# Patient Record
Sex: MALE | URBAN - METROPOLITAN AREA
[De-identification: names, ages, dates, MRNs, and addresses within clinical notes are randomized per-mention and may not be internally consistent; named-entity substitution may affect disease eponyms.]

---

## 2023-08-25 ENCOUNTER — TELEPHONE (OUTPATIENT)
Dept: CARDIOLOGY CLINIC | Age: 68
End: 2023-08-25

## 2023-08-25 NOTE — TELEPHONE ENCOUNTER
----- Message from Jose A Viveros MD sent at 8/25/2023  4:16 PM EDT -----  Please let patient know that his ILR does show slow tachycardia that is too slow for his ILR to detect. Please offer a 2 week cardiac monitor. I spoke with Dr. Maryam Posada and we will be sending in new medication and I'll be on look out for echocardiogram to look at his valve function. Thanks.